# Patient Record
Sex: FEMALE | URBAN - METROPOLITAN AREA
[De-identification: names, ages, dates, MRNs, and addresses within clinical notes are randomized per-mention and may not be internally consistent; named-entity substitution may affect disease eponyms.]

---

## 2019-02-25 ENCOUNTER — APPOINTMENT (RX ONLY)
Dept: URBAN - METROPOLITAN AREA CLINIC 152 | Facility: CLINIC | Age: 54
Setting detail: DERMATOLOGY
End: 2019-02-25

## 2019-02-25 DIAGNOSIS — B86 SCABIES: ICD-10-CM

## 2019-02-25 DIAGNOSIS — B07.8 OTHER VIRAL WARTS: ICD-10-CM

## 2019-02-25 PROBLEM — H91.90 UNSPECIFIED HEARING LOSS, UNSPECIFIED EAR: Status: ACTIVE | Noted: 2019-02-25

## 2019-02-25 PROBLEM — E78.5 HYPERLIPIDEMIA, UNSPECIFIED: Status: ACTIVE | Noted: 2019-02-25

## 2019-02-25 PROBLEM — L30.9 DERMATITIS, UNSPECIFIED: Status: ACTIVE | Noted: 2019-02-25

## 2019-02-25 PROCEDURE — 99202 OFFICE O/P NEW SF 15 MIN: CPT | Mod: 25

## 2019-02-25 PROCEDURE — ? OTHER

## 2019-02-25 PROCEDURE — ? PRESCRIPTION

## 2019-02-25 PROCEDURE — ? COUNSELING

## 2019-02-25 PROCEDURE — ? LIQUID NITROGEN

## 2019-02-25 PROCEDURE — 17110 DESTRUCTION B9 LES UP TO 14: CPT

## 2019-02-25 RX ORDER — IVERMECTIN 3 MG/1
TABLET ORAL
Qty: 10 | Refills: 0 | Status: ERX | COMMUNITY
Start: 2019-02-25

## 2019-02-25 RX ADMIN — IVERMECTIN: 3 TABLET ORAL at 22:34

## 2019-02-25 ASSESSMENT — LOCATION DETAILED DESCRIPTION DERM
LOCATION DETAILED: RIGHT VENTRAL PROXIMAL FOREARM
LOCATION DETAILED: LEFT CENTRAL LATERAL NECK

## 2019-02-25 ASSESSMENT — LOCATION SIMPLE DESCRIPTION DERM
LOCATION SIMPLE: NECK
LOCATION SIMPLE: RIGHT FOREARM

## 2019-02-25 ASSESSMENT — LOCATION ZONE DERM
LOCATION ZONE: ARM
LOCATION ZONE: NECK

## 2019-02-25 NOTE — PROCEDURE: MIPS QUALITY
Quality 431: Preventive Care And Screening: Unhealthy Alcohol Use - Screening: Patient screened for unhealthy alcohol use using a single question and scores less than 2 times per year
Quality 155 (Denominator): Falls Plan Of Care: Plan of Care not Documented, Reason not Otherwise Specified
Detail Level: Detailed
Quality 111:Pneumonia Vaccination Status For Older Adults: Pneumococcal Vaccination not Administered or Previously Received, Reason not Otherwise Specified
Quality 131: Pain Assessment And Follow-Up: Pain assessment using a standardized tool is documented as negative, no follow-up plan required
Quality 154 Part A: Falls: Risk Assessment (Should Be Reported With Measure 155.): Falls risk assessment completed and documented in the past 12 months.
Quality 154 Part B: Falls: Risk Screening (Should Be Reported With Measure 155.): Patient screened for future fall risk; documentation of no falls in the past year or only one fall without injury in the past year
Quality 110: Preventive Care And Screening: Influenza Immunization: Influenza Immunization Administered during Influenza season
Quality 47: Advance Care Plan: Advance Care Planning discussed and documented in the medical record; patient did not wish or was not able to name a surrogate decision maker or provide an advance care plan.
Quality 226: Preventive Care And Screening: Tobacco Use: Screening And Cessation Intervention: Patient screened for tobacco use and is an ex/non-smoker

## 2019-02-25 NOTE — PROCEDURE: OTHER
Other (Free Text): The patient recently had long term exposure to scabies. She has a slightly pruritic rash on right forearm. Due to exposure, the patient will be treated with permethrin.
Note Text (......Xxx Chief Complaint.): This diagnosis correlates with the
Detail Level: Zone
Other (Free Text): Counseled to clean living quarters.

## 2019-02-25 NOTE — PROCEDURE: LIQUID NITROGEN
Post-Care Instructions: I reviewed with the patient in detail post-care instructions. Patient is to wear sunprotection, and avoid picking at any of the treated lesions. Pt may apply Vaseline to crusted or scabbing areas.
Medical Necessity Information: It is in your best interest to select a reason for this procedure from the list below. All of these items fulfill various CMS LCD requirements except the new and changing color options.
Add 52 Modifier (Optional): no
Consent: The patient's consent was obtained including but not limited to risks of crusting, scabbing, blistering, scarring, darker or lighter pigmentary change, recurrence, incomplete removal and infection.
Medical Necessity Clause: This procedure was medically necessary because the lesions that were treated were: inflamed, enlarging, itchy
Detail Level: Detailed

## 2019-02-27 ENCOUNTER — RX ONLY (OUTPATIENT)
Age: 54
Setting detail: RX ONLY
End: 2019-02-27

## 2019-02-27 RX ORDER — PERMETHRIN 50 MG/G
CREAM TOPICAL
Qty: 2 | Refills: 0 | Status: ERX | COMMUNITY
Start: 2019-02-27

## 2025-05-02 ENCOUNTER — APPOINTMENT (OUTPATIENT)
Dept: URBAN - METROPOLITAN AREA CLINIC 153 | Facility: CLINIC | Age: 60
Setting detail: DERMATOLOGY
End: 2025-05-02

## 2025-05-02 DIAGNOSIS — B07.8 OTHER VIRAL WARTS: ICD-10-CM

## 2025-05-02 DIAGNOSIS — L24 IRRITANT CONTACT DERMATITIS: ICD-10-CM

## 2025-05-02 DIAGNOSIS — L72.0 EPIDERMAL CYST: ICD-10-CM

## 2025-05-02 PROBLEM — L24.9 IRRITANT CONTACT DERMATITIS, UNSPECIFIED CAUSE: Status: ACTIVE | Noted: 2025-05-02

## 2025-05-02 PROCEDURE — 99203 OFFICE O/P NEW LOW 30 MIN: CPT | Mod: 25

## 2025-05-02 PROCEDURE — ? PRESCRIPTION

## 2025-05-02 PROCEDURE — ? COUNSELING

## 2025-05-02 PROCEDURE — 17110 DESTRUCTION B9 LES UP TO 14: CPT

## 2025-05-02 PROCEDURE — ? BENIGN DESTRUCTION

## 2025-05-02 RX ORDER — TRIAMCINOLONE ACETONIDE 1 MG/G
CREAM TOPICAL
Qty: 30 | Refills: 5 | Status: ERX | COMMUNITY
Start: 2025-05-02

## 2025-05-02 RX ADMIN — TRIAMCINOLONE ACETONIDE: 1 CREAM TOPICAL at 00:00

## 2025-05-02 ASSESSMENT — LOCATION DETAILED DESCRIPTION DERM
LOCATION DETAILED: LEFT SUPERIOR LATERAL NECK
LOCATION DETAILED: RIGHT RADIAL PALM
LOCATION DETAILED: LEFT MEDIAL CANTHUS

## 2025-05-02 ASSESSMENT — LOCATION ZONE DERM
LOCATION ZONE: EYELID
LOCATION ZONE: HAND
LOCATION ZONE: NECK

## 2025-05-02 ASSESSMENT — LOCATION SIMPLE DESCRIPTION DERM
LOCATION SIMPLE: LEFT EYELID
LOCATION SIMPLE: RIGHT HAND
LOCATION SIMPLE: NECK

## 2025-05-02 NOTE — PROCEDURE: BENIGN DESTRUCTION
Detail Level: Detailed
Include Z78.9 (Other Specified Conditions Influencing Health Status) As An Associated Diagnosis?: No
Anesthesia Volume In Cc: 0.5
Treatment Number (Will Not Render If 0): 0
Post-Care Instructions: I reviewed with the patient in detail post-care instructions. Patient is to wear sunprotection, and avoid picking at any of the treated lesions. Pt may apply Vaseline to crusted or scabbing areas.
Medical Necessity Information: It is in your best interest to select a reason for this procedure from the list below. All of these items fulfill various CMS LCD requirements except the new and changing color options.
Medical Necessity Clause: This procedure was medically necessary because the lesions that were treated were:
Consent: The patient's consent was obtained including but not limited to risks of crusting, scabbing, blistering, scarring, darker or lighter pigmentary change, recurrence, incomplete removal and infection.